# Patient Record
Sex: FEMALE | Race: WHITE | Employment: FULL TIME | URBAN - METROPOLITAN AREA
[De-identification: names, ages, dates, MRNs, and addresses within clinical notes are randomized per-mention and may not be internally consistent; named-entity substitution may affect disease eponyms.]

---

## 2017-02-15 ENCOUNTER — HOSPITAL ENCOUNTER (OUTPATIENT)
Dept: PHYSICAL THERAPY | Age: 27
Discharge: HOME | End: 2017-02-15
Payer: COMMERCIAL

## 2017-02-15 PROCEDURE — 97161 PT EVAL LOW COMPLEX 20 MIN: CPT

## 2017-02-15 NOTE — PROGRESS NOTES
PHYSICAL THERAPY - DAILY TREATMENT NOTE    Patient Name: Letty Morales        Date: 2/15/2017  : 1990   YES Patient  Verified  Visit #:     Insurance: Payor: Vern Gordon / Plan:  [address] [facility] / Product Type: Commerical /      In time: 8 Out time: 8:30   Total Treatment Time: 30     Medicare Time Tracking (below)   Total Timed Codes (min):  - 1:1 Treatment Time:  -     TREATMENT AREA =  L/S    SUBJECTIVE    Pain Level (on 0 to 10 scale):  4   10   Medication Changes/New allergies or changes in medical history, any new surgeries or procedures? NO    If yes, update Summary List   Subjective Functional Status/Changes:  []  No changes reported     See eval          OBJECTIVE    Modality rationale:  -    min [] Estim, type:                                          []  att     []  unatt     []  w/US     []  w/ice    []  w/heat    min []  Mechanical Traction: type/lbs                                               []  pro   []  sup   []  int   []  cont    min []  Ultrasound, settings/location:      min []  Iontophoresis:  []  take home patch w/ dexamethazone    min                                []  in clinic w/ dexamethazone    min []  Ice     []  Heat     position:     min []  Other:      - min Therapeutic Exercise:  [x]  See flow sheet   []  Other:      []  Added:     to improve (function):    []  Changed:     to improve (function):       min Therapeutic Activity:      5 min Manual Therapy: Passive L hip ext and R hip flex, MET to contract L hip flexor and R HS, GD IV AP mobs to sacrum, OP with REIL       min Gait Training:       min Patient Education:  YES  Reviewed HEP   []  Progressed/Changed HEP based on:         Other Objective/Functional Measures:    See eval     Post Treatment Pain Level (on 0 to 10) scale:  5  / 10     ASSESSMENT    []  See Progress Note/Recertification   Patient will continue to benefit from skilled therapy to address remaining functional deficits: see eval Progress toward goals / Updated goals:    -     PLAN    [x]  Upgrade activities as tolerated YES Continue plan of care   []  Discharge due to :    []  Other:      Therapist: David Jonas PT    Date: 2/15/2017 Time: 7:56 AM
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
## 2017-02-28 ENCOUNTER — HOSPITAL ENCOUNTER (OUTPATIENT)
Dept: PHYSICAL THERAPY | Age: 27
Discharge: HOME | End: 2017-02-28
Payer: COMMERCIAL

## 2017-02-28 PROCEDURE — 97140 MANUAL THERAPY 1/> REGIONS: CPT

## 2017-02-28 PROCEDURE — 97110 THERAPEUTIC EXERCISES: CPT

## 2017-02-28 NOTE — PROGRESS NOTES
PHYSICAL THERAPY - DAILY TREATMENT NOTE    Patient Name: Em Crooks        Date: 2017  : 1990    Patient  Verified: YES  Visit #:   2   of     Insurance: Payor: Kizzy Stinson / [facility]  [facility] / Product Type: Commerical /      In time: 5:40 Out time: 6:20   Total Treatment Time: 40     Medicare Time Tracking (below)   Total Timed Codes (min):  - 1:1 Treatment Time:  -     TREATMENT AREA/ DIAGNOSIS = SI (sacroiliac) joint dysfunction [M53.3]    SUBJECTIVE  Pain Level (on 0 to 10 scale):  0  / 10   Medication Changes/New allergies or changes in medical history, any new surgeries or procedures? NO    If yes, update Summary List   Subjective Functional Status/Changes:  []  No changes reported     Functional improvement no pain right now   Functional limitation worse in the morning        5 min Manual Therapy: OP with REIL, L LAD of hip   Rationale:      reduce disc to improve patient's ability to perform ADLs without pain    35 min Therapeutic Exercise:  [x]  See flow sheet   Rationale:      increase ROM and increase strength to improve the patients ability to perform ADLs without pain          min Patient Education:  Yes    [x] Reviewed HEP   []  Progressed/Changed HEP based on:         Other Objective/Functional Measures:    Pt reports the exercises (L SGIS and REIL) are helping  Pt with pain across mid sacrum with extension (REIL)   Did lots of core stability exercises without pain  Pt with elevated L illiac crest improved with L LAD   Post Treatment Pain Level (on 0 to 10) scale:   0  / 10     ASSESSMENT  Assessment/Changes in Function:     Less pain     []  See Progress Note/Recertification   Patient will continue to benefit from skilled PT services to modify and progress therapeutic interventions, address functional mobility deficits, address ROM deficits, address strength deficits, analyze and address soft tissue restrictions and analyze and cue movement patterns to attain remaining goals.    Progress toward goals / Updated goals:    Less pain     PLAN  [x]  Upgrade activities as tolerated YES Continue plan of care   []  Discharge due to :    []  Other:      Therapist: Pavan Gonzalez PT    Date: 2/28/2017 Time: 6:29 PM        Future Appointments  Date Time Provider Ruthann Linda   3/7/2017 8:00 AM Madison Cardenas, PT [facility]   3/9/2017 6:00 PM Pavan Gonzalez, PT [facility]   3/13/2017 6:00 PM Madison Cardenas PT [facility]   3/16/2017 6:00 PM Pavan Gonzalez PT [facility]   3/20/2017 6:00 PM Madison Cardenas PT [facility]   3/23/2017 6:00 PM Pavan Gonzalez PT [facility]   3/27/2017 6:00 PM Madison Cardenas PT [facility]   3/30/2017 6:00 PM Pavan Gonzalez, PT [facility]

## 2017-03-02 ENCOUNTER — HOSPITAL ENCOUNTER (OUTPATIENT)
Dept: PHYSICAL THERAPY | Age: 27
End: 2017-03-02
Payer: COMMERCIAL

## 2017-03-07 ENCOUNTER — HOSPITAL ENCOUNTER (OUTPATIENT)
Dept: PHYSICAL THERAPY | Age: 27
Discharge: HOME | End: 2017-03-07
Payer: COMMERCIAL

## 2017-03-07 PROCEDURE — 97110 THERAPEUTIC EXERCISES: CPT

## 2017-03-07 PROCEDURE — 97140 MANUAL THERAPY 1/> REGIONS: CPT

## 2017-03-07 NOTE — PROGRESS NOTES
PHYSICAL THERAPY - DAILY TREATMENT NOTE      Patient Name: Monica Nicole        Date: 3/7/2017  : 1990   YES Patient  Verified  Visit #:   3   of   12  Insurance: Payor: Unknown Ades / Plan: [address] PT / Product Type: Commerical /      In time: 7:45 Out time: 8:25   Total Treatment Time: 40     Medicare Time Tracking (below)   Total Timed Codes (min):   1:1 Treatment Time:       TREATMENT AREA = SI (sacroiliac) joint dysfunction [M53.3]    SUBJECTIVE    Pain Level (on 0 to 10 scale):  0  / 10   Medication Changes/New allergies or changes in medical history, any new surgeries or procedures? NO    If yes, update Summary List   Subjective Functional Status/Changes:  []  No changes reported     \"Less burning in the L glute but still feels a little pinchy on the R side. I've been doing a lot better overall. \"       OBJECTIVE    30 min Therapeutic Exercise:  [x]  See flow sheet   Rationale:      increase ROM and increase strength to improve the patients ability to complete ADLs       10 min Manual Therapy:  P/A mobs to L sacral base, DTM L piriformis   Rationale:      decrease pain, increase ROM and increase tissue extensibility to improve patient's ability to complete ADLs       min Patient Education:  YES  Reviewed HEP   []  Progressed/Changed HEP based on: Other Objective/Functional Measures:    Pt able to do figure 4 piriformis stretch B without pain. Sacrum appeared slightly rotated to the R; addressed with manual     Post Treatment Pain Level (on 0 to 10) scale:   0  / 10     ASSESSMENT    Assessment/Changes in Function:     Decreased pain overall. She reports intermittent ache in R side of hip and some muscle burning in L glute but this is decreasing.      []  See Progress Note/Recertification   Patient will continue to benefit from skilled PT services to modify and progress therapeutic interventions, address functional mobility deficits, address ROM deficits, address strength deficits, analyze and address soft tissue restrictions, analyze and cue movement patterns, analyze and modify body mechanics/ergonomics, assess and modify postural abnormalities and instruct in home and community integration to attain remaining goals. to attain remaining goals.    Progress toward goals / Updated goals:    Progressing towards all goals     PLAN    []  Upgrade activities as tolerated YES Continue plan of care   []  Discharge due to :    []  Other:      Therapist: Leopoldo Sero, PT    Date: 3/7/2017 Time: 8:11 AM   Future Appointments  Date Time Provider Ruthann Linda   3/9/2017 6:00 PM Drusilla Oppenheim, PT [facility]   3/13/2017 6:00 PM Leopoldo Sero, PT [facility]   3/16/2017 6:00 PM Drusilla Oppenheim, PT [facility]   3/20/2017 6:00 PM Leopoldo Sero, PT [facility]   3/23/2017 6:00 PM Drusilla Oppenheim, PT [facility]   3/27/2017 6:00 PM Leopoldo Sero, PT [facility]   3/30/2017 6:00 PM Drusilla Oppenheim, PT [facility]

## 2017-03-09 ENCOUNTER — HOSPITAL ENCOUNTER (OUTPATIENT)
Dept: PHYSICAL THERAPY | Age: 27
Discharge: HOME | End: 2017-03-09
Payer: COMMERCIAL

## 2017-03-09 PROCEDURE — 97140 MANUAL THERAPY 1/> REGIONS: CPT

## 2017-03-09 PROCEDURE — 97110 THERAPEUTIC EXERCISES: CPT

## 2017-03-10 NOTE — PROGRESS NOTES
PHYSICAL THERAPY - DAILY TREATMENT NOTE    Patient Name: Curlie Skiff        Date: 3/9/2017  : 1990    Patient  Verified: YES  Visit #:   4   of   12  Insurance: Payor: Marianela Mota / Plan:  [address] [facility] / Product Type: Commerical /      In time: 5:50 Out time: 6:40   Total Treatment Time: 50     Medicare Time Tracking (below)   Total Timed Codes (min):  - 1:1 Treatment Time:  -     TREATMENT AREA/ DIAGNOSIS = SI (sacroiliac) joint dysfunction [M53.3]    SUBJECTIVE  Pain Level (on 0 to 10 scale):  2  / 10   Medication Changes/New allergies or changes in medical history, any new surgeries or procedures?     NO    If yes, update Summary List   Subjective Functional Status/Changes:  []  No changes reported     Functional improvement  i'm better  Functional limitation  Bad d ay today     OBJECTIVE  Modalities Rationale:     decrease edema, decrease inflammation and decrease pain to improve patient's ability to perform ADLs without pain   min [] Estim, type/location:                                      []  att     []  unatt     []  w/US     []  w/ice    []  w/heat    min []  Mechanical Traction: type/lbs                   []  pro   []  sup   []  int   []  cont    []  before manual    []  after manual    min []  Ultrasound, settings/location:      min []  Iontophoresis w/ dexamethasone, location:                                               []  take home patch       []  in clinic   10 min [x]  Ice     []  Heat    location/position:     min []  Vasopneumatic Device, press/temp:     min []  Other:    [x] Skin assessment post-treatment (if applicable):    [x]  intact    []  redness- no adverse reaction     []redness  adverse reaction:        20 min Manual Therapy: DTM to L/S and upper glutes, GD VI PA mobs to sacrum, OP wit REIL, MET to correct L post inominate   Rationale:      decrease pain, increase ROM and increase tissue extensibility to improve patient's ability to perform ADLs without pain    20 min Therapeutic Exercise:  [x]  See flow sheet   Rationale:      increase ROM and increase strength to improve the patients ability to perform ADLs without pain          min Patient Education:  Yes    [x] Reviewed HEP   []  Progressed/Changed HEP based on: Other Objective/Functional Measures:    Pt with B buttock pain and L thigh tingling (earlier today)  Pain with sit to stand  Pain free sit to stand after REIL and REIL with OP and use of proper hip hinge to stand   pt with elevated L ASIS, unable to correct with manual therapy   Post Treatment Pain Level (on 0 to 10) scale:   1  / 10     ASSESSMENT  Assessment/Changes in Function:     Pt with signs of L lower L/S disc derangement and SI joint dysfunction     []  See Progress Note/Recertification   Patient will continue to benefit from skilled PT services to modify and progress therapeutic interventions, address functional mobility deficits, address ROM deficits, address strength deficits, analyze and address soft tissue restrictions and analyze and cue movement patterns to attain remaining goals.    Progress toward goals / Updated goals:    Pt with less pain after treatment     PLAN  [x]  Upgrade activities as tolerated YES Continue plan of care   []  Discharge due to :    []  Other:      Therapist: Warden Kari PT    Date: 3/9/2017 Time: 7:00 PM        Future Appointments  Date Time Provider Ruthann Linda   3/13/2017 6:00 PM Madalyn Rogers [facility]   3/16/2017 6:00 PM Warden Kari PT [facility]   3/20/2017 6:00 PM Lainey Russell PT [facility]   3/23/2017 6:00 PM Warden Kari PT [facility]   3/27/2017 6:00 PM Lainey Russell PT [facility]   3/30/2017 6:00 PM Warden Kari PT [facility]

## 2017-03-13 ENCOUNTER — HOSPITAL ENCOUNTER (OUTPATIENT)
Dept: PHYSICAL THERAPY | Age: 27
Discharge: HOME | End: 2017-03-13
Payer: COMMERCIAL

## 2017-03-13 PROCEDURE — 97140 MANUAL THERAPY 1/> REGIONS: CPT

## 2017-03-13 PROCEDURE — 97110 THERAPEUTIC EXERCISES: CPT

## 2017-03-13 NOTE — PROGRESS NOTES
Mary Sheehan 30  [facility] PHYSICAL THERAPY AT [address] [facility], [address] [place] [address], [address] [place] [address], [address] [place] [address]  Phone: [phone]  Fax: [phone]  PROGRESS NOTE  Patient Name: Jagdeep Jules : 1990   Treatment/Medical Diagnosis: SI (sacroiliac) joint dysfunction [M53.3]   Referral Source: July Gasca DO     Date of Initial Visit: 2/15/17 Attended Visits: 5 Missed Visits: 0     SUMMARY OF TREATMENT  Physical therapy has consisted of therapeutic exercise and neuromuscular re-education for lumbar extension exercises, core strengthening, hip ROM, manual therapy including joint mobs and DTM, pt education for activity modification and HEP, and ice for pain relief. CURRENT STATUS  Pt has made slow, steady progress with above POC. She reports decreased low back pain and L LE sx have almost completely resolved. She has responded well with extension exercises and is able to centralize sx. However, extension seemed to aggravate SIJ pain at most recent visit. Pelvic alignment remains asymmetrical, and maintaining correction with MET has been limited. FOTO score improved to 67% compared to 54% at initial evaluation. Pt would benefit from continued PT to restore normal alignment, improve core strength, and decrease sx to facilitate work duties and working out. Previous Goals:  1. Patient able to centralize her L buttock pain with HEP  2. Achieve and maintain proper pelvic alingement for > 2 weeks     Prior Level/Current Level:  1) Prior Level: unable   Current Level: able to centralize with extension   Goal Met? yes  2) Prior Level: n/a   Current Level: unable to maintain proper pelvic alignment   Goal Met? no    New Goals to be achieved in __4__  weeks:  1. Achieve and maintain proper pelvic alingement for > 2 weeks  2. All ADLs without LBP > 2/10  3. Able to return to previoius level of activity, including running and softball, without LBP > 2/10  4.  Patient Independent with HEP/selfcare    RECOMMENDATIONS  Continue PT 2x/week for 4 weeks  If you have any questions/comments please contact us directly at [phone]. Thank you for allowing us to assist in the care of your patient. Therapist Signature: Nury Goodman PT, DPT Date: 3/13/2017     Time: 7:02 PM   NOTE TO PHYSICIAN:  PLEASE COMPLETE THE ORDERS BELOW AND FAX TO   [facility] at [address]: [phone]. If you are unable to process this request in 24 hours please contact our office: [phone].    ___ I have read the above report and request that my patient continue as recommended.   ___ I have read the above report and request that my patient continue therapy with the following changes/special instructions:_________________________________________________________   ___ I have read the above report and request that my patient be discharged from therapy.      Physician Signature:        Date:       Time:

## 2017-03-13 NOTE — PROGRESS NOTES
PHYSICAL THERAPY - DAILY TREATMENT NOTE      Patient Name: Alana Victoria        Date: 3/13/2017  : 1990   YES Patient  Verified  Visit #:     Insurance: Payor: Tyler Parikh / Plan: [address] PT / Product Type: Commerical /      In time: 5:55 Out time: 6:50   Total Treatment Time: 55     Medicare Time Tracking (below)   Total Timed Codes (min):   1:1 Treatment Time:       TREATMENT AREA = SI (sacroiliac) joint dysfunction [M53.3]    SUBJECTIVE    Pain Level (on 0 to 10 scale):  1  / 10   Medication Changes/New allergies or changes in medical history, any new surgeries or procedures? NO    If yes, update Summary List   Subjective Functional Status/Changes:  []  No changes reported     \"I still can't sit up and make a 90 degree angle with my body. I still have some pain in the SIJ when I make quick movements. \"       OBJECTIVE    30 min Therapeutic Exercise:  [x]  See flow sheet   Rationale:      increase ROM and increase strength to improve the patients ability to sit, run       15 min Manual Therapy: A/P mobs to sacral base, MET to correct post rot of L inominate, DTM B sacral borders and piriformis muscles   Rationale:      decrease pain, increase ROM, increase tissue extensibility and decrease trigger points to improve patient's ability to sit, run    Modalities Rationale:     decrease pain to improve patient's ability to sit      min [] Estim, type/location:                                      []  att     []  unatt     []  w/US     []  w/ice    []  w/heat    min []  Mechanical Traction: type/lbs                   []  pro   []  sup   []  int   []  cont    []  before manual    []  after manual    min []  Ultrasound, settings/location:      min []  Iontophoresis w/ dexamethasone, location:                                               []  take home patch       []  in clinic   10 min [x]  Ice     []  Heat    location/position: Low back; supine    min []  Vasopneumatic Device, press/temp:     min []  Other:    [] Skin assessment post-treatment (if applicable):    [x]  intact    [x]  redness- no adverse reaction     []redness  adverse reaction:       min Patient Education:  YES  Reviewed HEP   []  Progressed/Changed HEP based on: Other Objective/Functional Measures:    Pt reports intermittent pain in R posterior/superior hip area lacie with long sitting. Intermittent sx in L leg but these have subsided significantly. Pt has \"pinching\" and some \"electric pain\" with press ups after doing prone LE extension. Unable to change with overpressure during press ups or with sacral mobs. Corrected technique for PHE to avoid lumbar ext but pt advised to hold press ups and focus on side glides if back or leg pain returns. Pt c/o aching in L shoulder as well. Post Treatment Pain Level (on 0 to 10) scale:   0  / 10     ASSESSMENT    Assessment/Changes in Function:     See PN     []  See Progress Note/Recertification   Patient will continue to benefit from skilled PT services to modify and progress therapeutic interventions, address functional mobility deficits, address ROM deficits, address strength deficits, analyze and address soft tissue restrictions, analyze and cue movement patterns, analyze and modify body mechanics/ergonomics, assess and modify postural abnormalities and instruct in home and community integration to attain remaining goals. to attain remaining goals.    Progress toward goals / Updated goals:    See PN     PLAN    []  Upgrade activities as tolerated YES Continue plan of care   []  Discharge due to :    []  Other:      Therapist: Steph Jara PT    Date: 3/13/2017 Time: 5:56 PM   Future Appointments  Date Time Provider Ruthann Linda   3/13/2017 6:00 PM Madalyn Williamson [facility]   3/16/2017 6:00 PM Love Corado, PT [facility]   3/20/2017 6:00 PM Steph Jara PT [facility]   3/23/2017 6:00 PM Love Corado, PT [facility]   3/27/2017 9:30 DIEGO Nguyen, PT [facility]   3/30/2017 6:00 PM Drusilla Oppenheim, PT [facility]

## 2017-03-16 ENCOUNTER — HOSPITAL ENCOUNTER (OUTPATIENT)
Dept: PHYSICAL THERAPY | Age: 27
Discharge: HOME | End: 2017-03-16
Payer: COMMERCIAL

## 2017-03-16 PROCEDURE — 97140 MANUAL THERAPY 1/> REGIONS: CPT

## 2017-03-16 PROCEDURE — 97110 THERAPEUTIC EXERCISES: CPT

## 2017-03-16 NOTE — PROGRESS NOTES
PHYSICAL THERAPY - DAILY TREATMENT NOTE    Patient Name: Kwesi Campos        Date: 3/16/2017  : 1990    Patient  Verified: YES  Visit #:     Insurance: Payor: Laly Willett / Plan:  [facility] [address] PT / Product Type: Commerical /      In time: 5:45 Out time: 6:25   Total Treatment Time: 40     Medicare Time Tracking (below)   Total Timed Codes (min):  - 1:1 Treatment Time:  -     TREATMENT AREA/ DIAGNOSIS = SI (sacroiliac) joint dysfunction [M53.3]    SUBJECTIVE  Pain Level (on 0 to 10 scale):  1  / 10   Medication Changes/New allergies or changes in medical history, any new surgeries or procedures?     NO    If yes, update Summary List   Subjective Functional Status/Changes:  []  No changes reported     Functional improvement im much better   Functional limitation it hurts to do the leg lifts on my stomach      OBJECTIVE  Modalities Rationale:     decrease edema, decrease inflammation and decrease pain to improve patient's ability to perform ADLs without pain   min [] Estim, type/location:                                      []  att     []  unatt     []  w/US     []  w/ice    []  w/heat    min []  Mechanical Traction: type/lbs                   []  pro   []  sup   []  int   []  cont    []  before manual    []  after manual    min []  Ultrasound, settings/location:      min []  Iontophoresis w/ dexamethasone, location:                                               []  take home patch       []  in clinic   10 min [x]  Ice     []  Heat    location/position:     min []  Vasopneumatic Device, press/temp:     min []  Other:    [x] Skin assessment post-treatment (if applicable):    [x]  intact    []  redness- no adverse reaction     []redness  adverse reaction:        10 min Manual Therapy: DTM to L/S and lower T/S, OP with REIL, L LE LAD   Rationale:      decrease pain, increase ROM and increase tissue extensibility to improve patient's ability to perform ADLs without pain    20 min Therapeutic Exercise:  [x]  See flow sheet   Rationale:      increase ROM and increase strength to improve the patients ability to perform ADLs without pain          min Patient Education:  Yes    [x] Reviewed HEP   []  Progressed/Changed HEP based on: Other Objective/Functional Measures:    Slight pinch in sacrum with PHE and REIL  Much less pain in general   Post Treatment Pain Level (on 0 to 10) scale:   0  / 10     ASSESSMENT  Assessment/Changes in Function:     Responding well to extension  Still some sacro-lumbar issues     []  See Progress Note/Recertification   Patient will continue to benefit from skilled PT services to modify and progress therapeutic interventions, address functional mobility deficits, address ROM deficits, address strength deficits, analyze and address soft tissue restrictions, analyze and cue movement patterns and analyze and modify body mechanics/ergonomics to attain remaining goals.    Progress toward goals / Updated goals:    Pt now working out at the gym, including elliptical, without pain     PLAN  [x]  Upgrade activities as tolerated YES Continue plan of care   []  Discharge due to :    []  Other:      Therapist: Joey Gallardo PT    Date: 3/16/2017 Time: 6:33 PM        Future Appointments  Date Time Provider Ruthann Linda   3/20/2017 6:00 PM Madalyn Baxter [facility]   3/23/2017 6:00 PM Joey Gallardo PT [facility] AT [facility]   3/27/2017 9:30 AM Pato Alfonso PT [facility] AT [facility]   3/30/2017 6:00 PM Joey Gallardo PT [facility] AT [facility]

## 2017-03-20 ENCOUNTER — HOSPITAL ENCOUNTER (OUTPATIENT)
Dept: PHYSICAL THERAPY | Age: 27
Discharge: HOME | End: 2017-03-20
Payer: COMMERCIAL

## 2017-03-20 PROCEDURE — 97140 MANUAL THERAPY 1/> REGIONS: CPT

## 2017-03-20 PROCEDURE — 97110 THERAPEUTIC EXERCISES: CPT

## 2017-03-20 NOTE — PROGRESS NOTES
PHYSICAL THERAPY - DAILY TREATMENT NOTE      Patient Name: Alana Victoria        Date: 3/20/2017  : 1990   YES Patient  Verified  Visit #:     Insurance: Payor: Tyler Parikh / Plan: [address] [place] [address] Rd PT / Product Type: Commerical /      In time: 6:00 Out time: 6:45   Total Treatment Time: 45     Medicare Time Tracking (below)   Total Timed Codes (min):   1:1 Treatment Time:       TREATMENT AREA = SI (sacroiliac) joint dysfunction [M53.3]    SUBJECTIVE    Pain Level (on 0 to 10 scale):  0  / 10   Medication Changes/New allergies or changes in medical history, any new surgeries or procedures? NO    If yes, update Summary List   Subjective Functional Status/Changes:  []  No changes reported     \"The back pain just comes and goes. I still can't sit with my legs out straight. \"       OBJECTIVE    20 min Therapeutic Exercise:  [x]  See flow sheet   Rationale:      increase ROM and increase strength to improve the patients ability to complete ADLs, work out       15 min Manual Therapy:  DTM R QL, iliac crest release, P/A mobs to sacral base, OP for REIL   Rationale:      decrease pain, increase ROM, increase tissue extensibility and decrease trigger points to improve patient's ability to complete ADLs, work out    Modalities Rationale:     decrease pain to improve patient's ability to complete ADLs      min [] Estim, type/location:                                      []  att     []  unatt     []  w/US     []  w/ice    []  w/heat    min []  Mechanical Traction: type/lbs                   []  pro   []  sup   []  int   []  cont    []  before manual    []  after manual    min []  Ultrasound, settings/location:      min []  Iontophoresis w/ dexamethasone, location:                                               []  take home patch       []  in clinic   10 min [x]  Ice     []  Heat    location/position: Low back; supine    min []  Vasopneumatic Device, press/temp:     min []  Other:    [] Skin assessment post-treatment (if applicable):    [x]  intact    [x]  redness- no adverse reaction     []redness  adverse reaction:       min Patient Education:  YES  Reviewed HEP   []  Progressed/Changed HEP based on: Other Objective/Functional Measures:    Pt reported increased pulling/pain in R buttock with nerve glides on the L (ankle DF). When cued to straighten L knee more, pain decreased. When R leg was bent at hip and knee, pain increased. Post Treatment Pain Level (on 0 to 10) scale:   0  / 10     ASSESSMENT    Assessment/Changes in Function:     Decreased pain following manual and mat exercises. No pain with cat/cow today, which she reports usually brings on pain. Pt issued GTB for clams, R QL stretch, and segmental bridging for HEP     []  See Progress Note/Recertification   Patient will continue to benefit from skilled PT services to modify and progress therapeutic interventions, address functional mobility deficits, address ROM deficits, address strength deficits, analyze and address soft tissue restrictions, analyze and cue movement patterns, analyze and modify body mechanics/ergonomics, assess and modify postural abnormalities and instruct in home and community integration to attain remaining goals. to attain remaining goals.    Progress toward goals / Updated goals:    Slow progress towards all goals     PLAN    []  Upgrade activities as tolerated YES Continue plan of care   []  Discharge due to :    []  Other:      Therapist: Kristine Benítez PT    Date: 3/20/2017 Time: 6:01 PM   Future Appointments  Date Time Provider Ruthann Linda   3/23/2017 6:00 PM Amador Gamble PT [facility] AT [facility]   3/27/2017 9:30 AM Cj Meredith PT [facility] AT [facility]   3/30/2017 6:00 PM Amador Gamble PT [facility] AT [facility]

## 2017-03-23 ENCOUNTER — HOSPITAL ENCOUNTER (OUTPATIENT)
Dept: PHYSICAL THERAPY | Age: 27
Discharge: HOME | End: 2017-03-23
Payer: COMMERCIAL

## 2017-03-23 PROCEDURE — 97110 THERAPEUTIC EXERCISES: CPT

## 2017-03-23 PROCEDURE — 97140 MANUAL THERAPY 1/> REGIONS: CPT

## 2017-03-23 NOTE — PROGRESS NOTES
PHYSICAL THERAPY - DAILY TREATMENT NOTE    Patient Name: Varsha Sanchez        Date: 3/23/2017  : 1990    Patient  Verified: YES  Visit #:   8   of   16  Insurance: Payor: Louise North / Plan: [address] [place] [address] Rd PT / Product Type: Commerical /      In time: 5:40 Out time: 6:30   Total Treatment Time: 50     Medicare Time Tracking (below)   Total Timed Codes (min):  - 1:1 Treatment Time:  -     TREATMENT AREA/ DIAGNOSIS = SI (sacroiliac) joint dysfunction [M53.3]    SUBJECTIVE  Pain Level (on 0 to 10 scale):  0  / 10   Medication Changes/New allergies or changes in medical history, any new surgeries or procedures?     NO    If yes, update Summary List   Subjective Functional Status/Changes:  []  No changes reported     Functional improvement i feel good, no pain the last few days   Functional limitation-      OBJECTIVE  Modalities Rationale:     decrease edema, decrease inflammation and decrease pain to improve patient's ability to perform ADLs without pain   min [] Estim, type/location:                                      []  att     []  unatt     []  w/US     []  w/ice    []  w/heat    min []  Mechanical Traction: type/lbs                   []  pro   []  sup   []  int   []  cont    []  before manual    []  after manual    min []  Ultrasound, settings/location:      min []  Iontophoresis w/ dexamethasone, location:                                               []  take home patch       []  in clinic   10 min [x]  Ice     []  Heat    location/position:     min []  Vasopneumatic Device, press/temp:     min []  Other:    [x] Skin assessment post-treatment (if applicable):    [x]  intact    []  redness- no adverse reaction     []redness  adverse reaction:        10 min Manual Therapy: DTM to L/S and T/S, OP with rEIL   Rationale:      decrease pain, increase ROM and increase tissue extensibility to improve patient's ability to perform ADLs without pain    30 min Therapeutic Exercise:  [x]  See flow sheet Rationale:      increase ROM and increase strength to improve the patients ability to perform ADLs without pain          min Patient Education:  Yes    [x] Reviewed HEP   []  Progressed/Changed HEP based on: Other Objective/Functional Measures:    Pt with no pain the last few days  Tolerating flexion without pain in CAT stretch   Post Treatment Pain Level (on 0 to 10) scale:   0  / 10     ASSESSMENT  Assessment/Changes in Function:     Pt with less pain lately     []  See Progress Note/Recertification   Patient will continue to benefit from skilled PT services to modify and progress therapeutic interventions, address functional mobility deficits, address ROM deficits, address strength deficits, analyze and address soft tissue restrictions, analyze and cue movement patterns and analyze and modify body mechanics/ergonomics to attain remaining goals.    Progress toward goals / Updated goals:    No pain     PLAN  [x]  Upgrade activities as tolerated YES Continue plan of care   []  Discharge due to :    []  Other:      Therapist: Marilyn Cullen PT    Date: 3/23/2017 Time: 6:27 PM        Future Appointments  Date Time Provider Ruthann Linda   3/27/2017 9:30 AM Kristina Dunaway PT RE[facility] AT [facility]   3/30/2017 6:00 PM Marilyn Cullen PT RE[facility] AT [facility]

## 2017-03-27 ENCOUNTER — HOSPITAL ENCOUNTER (OUTPATIENT)
Dept: PHYSICAL THERAPY | Age: 27
Discharge: HOME | End: 2017-03-27
Payer: COMMERCIAL

## 2017-03-27 PROCEDURE — 97140 MANUAL THERAPY 1/> REGIONS: CPT | Performed by: PHYSICAL THERAPIST

## 2017-03-27 PROCEDURE — 97110 THERAPEUTIC EXERCISES: CPT | Performed by: PHYSICAL THERAPIST

## 2017-03-27 NOTE — PROGRESS NOTES
PHYSICAL THERAPY - DAILY TREATMENT NOTE    Patient Name: Eva Rueda        Date: 3/27/2017  : 1990   YES Patient  Verified  Visit #:     Insurance: Payor: Blaine Arreola / Plan: [address] / Product Type: Commerical /      In time: 043 Out time: 1030   Total Treatment Time: 55     TREATMENT AREA =  SI (sacroiliac) joint dysfunction [M53.3]    SUBJECTIVE  Pain Level (on 0 to 10 scale):  3  / 10   Medication Changes/New allergies or changes in medical history, any new surgeries or procedures? 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 My L SI hurts, I can't bend, and my R hip is achy. OBJECTIVE  Modalities Rationale:     decrease edema, decrease inflammation and decrease pain to improve patient's ability to  improve patient's ability to perform ADL's with decreased pain     min [] Estim, type/location:                                      []  att     []  unatt     []  w/US     []  w/ice    []  w/heat    min []  Mechanical Traction: type/lbs                   []  pro   []  sup   []  int   []  cont    []  before manual    []  after manual    min []  Ultrasound, settings/location:      min []  Iontophoresis w/ dexamethasone, location:                                               []  take home patch       []  in clinic   5 min [x]  Ice     []  Heat    location/position: Supine/hooklying    min []  Vasopneumatic Device, press/temp:     min []  Other:    [x] Skin assessment post-treatment (if applicable):    [x]  intact    []  redness- no adverse reaction     []redness  adverse reaction:        20 min Manual Therapy: SI mobilization with MET: R hip innom ant rotation, L on R sacral torsion. R hip grade 3 lateral and inferior hip glides. B QL DTM and R glute DTM.    Rationale:      decrease pain, increase ROM, increase tissue extensibility and decrease trigger points to improve patient's ability to stand/walk with manageable pain. 30 min Therapeutic Exercise:  [x]  See flow sheet   Rationale:      increase ROM and increase strength to improve the patients ability to  improve patient's ability to perform ADL's with decreased pain        min Patient Education:  YES  Reviewed HEP   []  Progressed/Changed HEP based on: Other Objective/Functional Measures:    R hip innom + in flexion, + squish test. R hip innom down, R leg long, pube level. R sacral sulcus deep and L BOB post/inf in prone. Sacral sulcus leveled with extension. All level post MET. Added TrAb pull in's, BKFO, femur arcs due to increased pain today. Post Treatment Pain Level (on 0 to 10) scale:   1-2  / 10     ASSESSMENT  Assessment/Changes in Function:     The pt likely flared back with attempt at return to softball activities and SI joint not accepting of these activities due to weakness and ongoing instability. []  See Progress Note/Recertification   Patient will continue to benefit from skilled PT services to modify and progress therapeutic interventions, address functional mobility deficits, address ROM deficits, address strength deficits, analyze and address soft tissue restrictions, analyze and cue movement patterns, analyze and modify body mechanics/ergonomics and assess and modify postural abnormalities to attain remaining goals. Progress toward goals / Updated goals:    New Goals to be achieved in __4__ weeks:  1. Achieve and maintain proper pelvic alingement for > 2 weeks  2. All ADLs without LBP > 2/10  3. Able to return to previoius level of activity, including running and softball, without LBP > 2/10  (attempted this, but unable to keep pain down)  4.  Patient Independent with HEP/selfcare  (progressing)     PLAN  [x]  Upgrade activities as tolerated YES Continue plan of care   []  Discharge due to :    []  Other: Therapist: Cj Meredith PT    Date: 3/27/2017 Time: 10:05 AM     Future Appointments  Date Time Provider Ruthann Linda   3/30/2017 6:00 PM Amador Gamble PT REH[facility] AT [facility]

## 2017-03-30 ENCOUNTER — HOSPITAL ENCOUNTER (OUTPATIENT)
Dept: PHYSICAL THERAPY | Age: 27
Discharge: HOME | End: 2017-03-30
Payer: COMMERCIAL

## 2017-03-30 PROCEDURE — 97110 THERAPEUTIC EXERCISES: CPT

## 2017-03-30 PROCEDURE — 97140 MANUAL THERAPY 1/> REGIONS: CPT

## 2017-03-30 NOTE — PROGRESS NOTES
PHYSICAL THERAPY - DAILY TREATMENT NOTE    Patient Name: Ana Chan        Date: 3/30/2017  : 1990    Patient  Verified: YES  Visit #:   10   of   16  Insurance: Payor: Walter Parikh / Plan:  [address] PT / Product Type: Commerical /      In time: 5:05 Out time: 5:45   Total Treatment Time: 40     Medicare Time Tracking (below)   Total Timed Codes (min):  - 1:1 Treatment Time:  -     TREATMENT AREA/ DIAGNOSIS = SI (sacroiliac) joint dysfunction [M53.3]    SUBJECTIVE  Pain Level (on 0 to 10 scale):  4  / 10   Medication Changes/New allergies or changes in medical history, any new surgeries or procedures?     NO    If yes, update Summary List   Subjective Functional Status/Changes:  []  No changes reported     Functional improvement  Softball made my pain much worse      OBJECTIVE  Modalities Rationale:     decrease edema, decrease inflammation and decrease pain to improve patient's ability to perform ADLs without pain   min [] Estim, type/location:                                      []  att     []  unatt     []  w/US     []  w/ice    []  w/heat    min []  Mechanical Traction: type/lbs                   []  pro   []  sup   []  int   []  cont    []  before manual    []  after manual    min []  Ultrasound, settings/location:      min []  Iontophoresis w/ dexamethasone, location:                                               []  take home patch       []  in clinic   10 min [x]  Ice     []  Heat    location/position:     min []  Vasopneumatic Device, press/temp:     min []  Other:    [x] Skin assessment post-treatment (if applicable):    [x]  intact    []  redness- no adverse reaction     []redness  adverse reaction:        10 min Manual Therapy: PA mobs to R sacral border, passive L hip extension, MET to contract L hip flexor and R HS    Rationale:      decrease pain, increase ROM and increase tissue extensibility to improve patient's ability to perform ADLs without pain    20 min Therapeutic Exercise:  [x]  See flow sheet   Rationale:      increase ROM, increase strength and improve coordination to improve the patients ability to perform ADLs without pain2          min Patient Education:  Yes    [x] Reviewed HEP   []  Progressed/Changed HEP based on: Other Objective/Functional Measures:    Pt with much more pain after attmpted softball this weekend  Pt with elevated L ASIS and  R rotated sacrum  Pain in L SI with REIL, made much less with manual to sacrum  Pt aadvised to avoid high impact exercise and focus on neutral core strengthening   Post Treatment Pain Level (on 0 to 10) scale:   1  / 10     ASSESSMENT  Assessment/Changes in Function:     SI knocked out of whack with softball     []  See Progress Note/Recertification   Patient will continue to benefit from skilled PT services to modify and progress therapeutic interventions, address functional mobility deficits, address ROM deficits, address strength deficits, analyze and address soft tissue restrictions, analyze and cue movement patterns and analyze and modify body mechanics/ergonomics to attain remaining goals.    Progress toward goals / Updated goals:    Less pain after treatment     PLAN  [x]  Upgrade activities as tolerated YES Continue plan of care   []  Discharge due to :    []  Other:      Therapist: Carmine Eddy PT    Date: 3/30/2017 Time: 6:33 PM        Future Appointments  Date Time Provider Ruthann Linda   4/4/2017 6:00 PM Carmine Eddy PT REHAB CENTER AT [address]   4/6/2017 7:30 AM Carmine Eddy, PT REHAB CENTER AT [address]   4/12/2017 6:00 PM Nawaf Nelson PT REHAB CENTER AT [address]   4/17/2017 6:00 PM Nawaf Nelson PT REHAB CENTER AT [address]   4/19/2017 4:30 PM Daniel Zarate PT REHAB CENTER AT [address]   4/24/2017 6:00 PM Nawaf Nelson PT REHAB CENTER AT [address]   4/26/2017 4:30 PM Daniel Zarate, PT REHAB CENTER AT [address]

## 2017-04-04 ENCOUNTER — HOSPITAL ENCOUNTER (OUTPATIENT)
Dept: PHYSICAL THERAPY | Age: 27
Discharge: HOME | End: 2017-04-04
Payer: COMMERCIAL

## 2017-04-04 PROCEDURE — 97140 MANUAL THERAPY 1/> REGIONS: CPT

## 2017-04-04 PROCEDURE — 97110 THERAPEUTIC EXERCISES: CPT

## 2017-04-04 NOTE — PROGRESS NOTES
PHYSICAL THERAPY - DAILY TREATMENT NOTE    Patient Name: Miguel A Gonzales        Date: 2017  : 1990    Patient  Verified: YES  Visit #:   6   of   16  Insurance: Payor: Shahnaz Loya / Plan:  [facility] [address] / Product Type: Commerical /      In time: 5:30 Out time: 6:10   Total Treatment Time: 40     Medicare Time Tracking (below)   Total Timed Codes (min):  - 1:1 Treatment Time:  -     TREATMENT AREA/ DIAGNOSIS = SI (sacroiliac) joint dysfunction [M53.3]    SUBJECTIVE  Pain Level (on 0 to 10 scale):  1  / 10   Medication Changes/New allergies or changes in medical history, any new surgeries or procedures?     NO    If yes, update Summary List   Subjective Functional Status/Changes:  []  No changes reported     Functional improvement the pain is not bad    Functional limitation mild pain with ext      OBJECTIVE  Modalities Rationale:     decrease edema, decrease inflammation and decrease pain to improve patient's ability to perform ADLs without pain   min [] Estim, type/location:                                      []  att     []  unatt     []  w/US     []  w/ice    []  w/heat    min []  Mechanical Traction: type/lbs                   []  pro   []  sup   []  int   []  cont    []  before manual    []  after manual    min []  Ultrasound, settings/location:      min []  Iontophoresis w/ dexamethasone, location:                                               []  take home patch       []  in clinic   10 min [x]  Ice     []  Heat    location/position:     min []  Vasopneumatic Device, press/temp:     min []  Other:    [x] Skin assessment post-treatment (if applicable):    [x]  intact    [x]  redness- no adverse reaction     []redness  adverse reaction:        20   min Manual Therapy: DTM to L/S, R piriformis release, L inf to sup sacral mobs, R sacral border PA mobs, passive R hip flexion,  MET to contract L hip flexor and R HS   Rationale:      decrease pain, increase ROM and increase tissue extensibility to improve patient's ability to perform ADLs without pain    10 min Therapeutic Exercise:  [x]  See flow sheet   Rationale:      increase strength to improve the patients ability to perform ADLs without pain          min Patient Education:  Yes    [x] Reviewed HEP   []  Progressed/Changed HEP based on: Other Objective/Functional Measures:    Pt with mild R SI joint pain with extension (L/S)  Made pain much worse in R lateral sacrum with spasm pain in R HS and piriformis after manual, unable to correct,   L illium high and L ASIS high relative to R  L lat flexion of sacrum  Pain worse after therapy   Post Treatment Pain Level (on 0 to 10) scale:   5  / 10     ASSESSMENT  Assessment/Changes in Function:     Transfer to [facility]? []  See Progress Note/Recertification   Patient will continue to benefit from skilled PT services to modify and progress therapeutic interventions, address functional mobility deficits, address ROM deficits, address strength deficits, analyze and address soft tissue restrictions, analyze and cue movement patterns and analyze and modify body mechanics/ergonomics to attain remaining goals. Progress toward goals / Updated goals:     May transfer patient to clinic with late [address] [place]  [x]  Upgrade activities as tolerated YES Continue plan of care   []  Discharge due to :    []  Other:      Therapist: Deniz Fajardo PT    Date: 4/4/2017 Time: 6:56 PM        Future Appointments  Date Time Provider Ruthann Linda   4/6/2017 7:30 AM Deniz Fajardo PT [facility] AT [facility]   4/12/2017 6:00 PM Timo Romero PT RE[facility]ENTER AT [facility]   4/17/2017 6:00 PM Timo Romero PT [facility] AT [facility]   4/19/2017 4:30 PM Alessandro Merrill PT REHAB CENTER AT [facility]   4/24/2017 6:00 PM Timo Romero PT RE[facility]ENTER AT [facility]   4/26/2017 4:30 PM Alessandro Merrill PT RE[facility]ENTER AT [facility]

## 2017-04-05 NOTE — PROGRESS NOTES
Mary Sheehan 30  [facility] AT [address] [facility], [address] [place] [address], [address] [place] [address]  Phone: [phone]  Fax: [phone]  PROGRESS NOTE  Patient Name: Miguel A Gonzales : 1990   Treatment/Medical Diagnosis: SI (sacroiliac) joint dysfunction [M53.3]   Referral Source: Janell Adkins DO     Date of Initial Visit: 2/15/17 Attended Visits: 11 Missed Visits: 0     SUMMARY OF TREATMENT  Physical therapy has consisted of therapeutic exercise and neuromuscular re-education for lumbar extension exercises, core strengthening, hip ROM, manual therapy including joint mobs, and muscle energy techniques to correct pelvic alingement, and DTM, pt education for activity modification and HEP, and ice for pain relief. CURRENT STATUS  Pt had made really good progress, with only mild R SI jiont pain until she played softball on . Where she aggravated her SI joint pain with shagging fly balls and hitting a few balls. That pain calmed down over the following week. Last night (3/4/17) she came in with 1/10 pain, with only mild R SI joint pain with extension, but left with significant \"pinching\" in her R SI and lateral R sacral border, and spasms in her HS region. She needs a SI joint specialist that works late. Our SI joitn specialists at [facility] do not work late, so I am transferring her to our [facility] clinic, which is closer to her home. Previous Goals:  1. All ADLs without LBP > 2/10  2. Achieve and maintain proper pelvic alingement for > 2 weeks  3. Able to return to previous level of activity  4. Independent with HEP     Prior Level/Current Level:  1) Prior Level: pain up to 3/10   Current Level: pain up to 7/10   Goal Met? no  2) Prior Level: poor pelvic alingment   Current Level: unable to maintain proper pelvic alignment   Goal Met?  No  3) Prior level: unable to return to previous level of activity   Current Level: significantt pain with attempted softball   GOAL MET? No  4. Prior Level: learning core stability HEP and ext based Hannah program   Current Level: Independent with                    Goal Met? Yes  New Goals to be achieved in __4__  weeks:  1. Achieve and maintain proper pelvic alingement for > 2 weeks  2. All ADLs without LBP > 2/10  3. Able to return to previoius level of activity, including running and softball, without LBP > 2/10  RECOMMENDATIONS  Continue PT 2x/week for 4 weeks  If you have any questions/comments please contact us directly at [phone]. Thank you for allowing us to assist in the care of your patient. Therapist Signature: Abelardo Chavez PT, DPT Date: 4/5/2017     Time: 7:02 PM   NOTE TO PHYSICIAN:  PLEASE COMPLETE THE ORDERS BELOW AND FAX TO   [facility] [place]: [phone]. If you are unable to process this request in 24 hours please contact our office: [phone].    ___ I have read the above report and request that my patient continue as recommended.   ___ I have read the above report and request that my patient continue therapy with the following changes/special instructions:_________________________________________________________   ___ I have read the above report and request that my patient be discharged from therapy.      Physician Signature:        Date:       Time:

## 2017-04-06 ENCOUNTER — APPOINTMENT (OUTPATIENT)
Dept: PHYSICAL THERAPY | Age: 27
End: 2017-04-06
Payer: COMMERCIAL

## 2017-04-12 ENCOUNTER — APPOINTMENT (OUTPATIENT)
Dept: PHYSICAL THERAPY | Age: 27
End: 2017-04-12
Payer: COMMERCIAL

## 2017-04-13 ENCOUNTER — HOSPITAL ENCOUNTER (OUTPATIENT)
Dept: PHYSICAL THERAPY | Age: 27
End: 2017-04-13
Payer: COMMERCIAL

## 2017-04-14 ENCOUNTER — HOSPITAL ENCOUNTER (OUTPATIENT)
Dept: PHYSICAL THERAPY | Age: 27
Discharge: HOME | End: 2017-04-14
Payer: COMMERCIAL

## 2017-04-14 PROCEDURE — 97110 THERAPEUTIC EXERCISES: CPT

## 2017-04-14 PROCEDURE — 97140 MANUAL THERAPY 1/> REGIONS: CPT

## 2017-04-14 NOTE — PROGRESS NOTES
PHYSICAL THERAPY - DAILY TREATMENT NOTE    Patient Name: Joe Crain        Date: 2017  : 1990   YES Patient  Verified  Visit #:     Insurance: Payor: Bryant Rashid / Plan: [address] [place] [address] Rd PT / Product Type: Commerical /      In time: 8:00 Out time: 8:45   Total Treatment Time: 45     Medicare Time Tracking (below)   Total Timed Codes (min):  na 1:1 Treatment Time:  na     TREATMENT AREA =  SI joint arthritis ([facility] [address] [M46.98]    SUBJECTIVE  Pain Level (on 0 to 10 scale):  2-3  / 10   Medication Changes/New allergies or changes in medical history, any new surgeries or procedures? NO    If yes, update Summary List   Subjective Functional Status/Changes:  []  No changes reported     I still feel the pinch on my R SI area with ext          OBJECTIVE  25 min Therapeutic Exercise:  [x]  See flow sheet   Rationale:      increase ROM, increase strength and improve coordination to improve the patients ability to perform ADLs     20 min Manual Therapy: DTM R Psoas, TFL, Gm, QL, Shot guntech, L5 L ERS, L on L Si osvaldo   Rationale:      decrease pain, increase ROM and increase tissue extensibility to improve patient's ability to perform ADLs     min Patient Education:  YES  Reviewed HEP   []  Progressed/Changed HEP based on: Other Objective/Functional Measures:    Inconsistent activation of R TrA noted     Post Treatment Pain Level (on 0 to 10) scale:   0  / 10     ASSESSMENT  Assessment/Changes in Function:     Good [place] to all Rx without increase in pain      []  See Progress Note/Recertification   Patient will continue to benefit from skilled PT services to modify and progress therapeutic interventions, address functional mobility deficits, address ROM deficits, address strength deficits, analyze and address soft tissue restrictions, analyze and cue movement patterns, analyze and modify body mechanics/ergonomics and assess and modify postural abnormalities to attain remaining goals. Progress toward goals / Updated goals:    No sig change towards LTGs today      PLAN  []  Upgrade activities as tolerated YES Continue plan of care   []  Discharge due to :    []  Other:      Therapist: Donya Felder, PT, OCS, SCS, CSCS    Date: 4/14/2017 Time: 8:51 AM       Future Appointments  Date Time Provider Ruthann Linda   4/17/2017 7:30 AM Isaura Beth, PT [facility]

## 2017-04-17 ENCOUNTER — HOSPITAL ENCOUNTER (OUTPATIENT)
Dept: PHYSICAL THERAPY | Age: 27
Discharge: HOME | End: 2017-04-17
Payer: COMMERCIAL

## 2017-04-17 ENCOUNTER — APPOINTMENT (OUTPATIENT)
Dept: PHYSICAL THERAPY | Age: 27
End: 2017-04-17
Payer: COMMERCIAL

## 2017-04-17 PROCEDURE — 97110 THERAPEUTIC EXERCISES: CPT

## 2017-04-17 PROCEDURE — 97140 MANUAL THERAPY 1/> REGIONS: CPT

## 2017-04-17 NOTE — PROGRESS NOTES
PHYSICAL THERAPY - DAILY TREATMENT NOTE    Patient Name: Mary Griffith        Date: 2017  : 1990   YES Patient  Verified  Visit #:   15   of   24  Insurance: Payor: Berta Valle / Plan: [address] [place] [address] [facility] / Product Type: Commerical /      In time: 7:30 Out time: 8:10   Total Treatment Time: 40     Medicare Time Tracking (below)   Total Timed Codes (min):  na 1:1 Treatment Time:  na     TREATMENT AREA =  SI joint arthritis ([address] [M46.98]    SUBJECTIVE  Pain Level (on 0 to 10 scale):  3  / 10   Medication Changes/New allergies or changes in medical history, any new surgeries or procedures? NO    If yes, update Summary List   Subjective Functional Status/Changes:  []  No changes reported     I have been working on breathing and I think it is helping          OBJECTIVE  30 min Therapeutic Exercise: [x] See flow sheet   Rationale: increase ROM, increase strength and improve coordination to improve the patients ability to perform ADLs      10 min Manual Therapy: DTM R Psoas, TFL, Gm, QL, Shot guntech, L5 L ERS, R PSIS PA with ext   Rationale: decrease pain, increase ROM and increase tissue extensibility to improve patient's ability to perform ADLs      min Patient Education:  YES  Reviewed HEP   []  Progressed/Changed HEP based on:         Other Objective/Functional Measures:  C/o pain at R SIJ with prone press ups, but resolved with PA mob to PSIS with prone press ups  C/o pain with bridge, but diminished with the use of band     Post Treatment Pain Level (on 0 to 10) scale:   0  / 10     ASSESSMENT  Assessment/Changes in Function:     Good ken[place] to all Rx without increase in pain      []  See Progress Note/Recertification   Patient will continue to benefit from skilled PT services to modify and progress therapeutic interventions, address functional mobility deficits, address ROM deficits, address strength deficits, analyze and address soft tissue restrictions, analyze and cue movement patterns, analyze and modify body mechanics/ergonomics and assess and modify postural abnormalities to attain remaining goals.    Progress toward goals / Updated goals:    Slowly progressing with pain reduction      PLAN  []  Upgrade activities as tolerated YES Continue plan of care   []  Discharge due to :    []  Other:      Therapist: Cely Urbina, PT, OCS, SCS, CSCS    Date: 4/17/2017 Time: 7:15 AM       Future Appointments  Date Time Provider Ruthann Linda   4/17/2017 7:30 AM Lott Goodpasture, PT [facility]

## 2017-04-19 ENCOUNTER — APPOINTMENT (OUTPATIENT)
Dept: PHYSICAL THERAPY | Age: 27
End: 2017-04-19
Payer: COMMERCIAL

## 2017-04-24 ENCOUNTER — APPOINTMENT (OUTPATIENT)
Dept: PHYSICAL THERAPY | Age: 27
End: 2017-04-24
Payer: COMMERCIAL

## 2017-04-26 ENCOUNTER — APPOINTMENT (OUTPATIENT)
Dept: PHYSICAL THERAPY | Age: 27
End: 2017-04-26
Payer: COMMERCIAL

## 2017-05-12 NOTE — PROGRESS NOTES
[facility] PHYSICAL THERAPY  [address] [place] [address], [address],Lona Etienne, [address] [facility] - Phone: [phone]  Fax: [phone]  DISCHARGE SUMMARY  Patient Name: Tommie Arellano : 1990   Treatment/Medical Diagnosis: SI joint arthritis [facility]) [M46.98]   Referral Source: Mendoza Ramos DO     Date of Initial Visit: 2/15/17 Attended Visits: 13 Missed Visits: 0     [address] has been seen at our clinic 2-3x/wk for a total of 13 visits. Pt treatment has consisted of therapeutic exercise for  hip/core strengthening and manual therapy (SI jt mobilization and deep tissue mobilization at QL, lute med, TFL)  CURRENT STATUS  Pt has had a good tolerance to physical therapy treatment. She reported no pain at the end of 13th session. However, she did not return to PT treatment after the 13th visit. RECOMMENDATIONS  Discharge from physical therapy treatment with HEP. Specifics:   If you have any questions/comments please contact us directly at [phone]. Thank you for allowing us to assist in the care of your patient.     Therapist Signature: Romy Bangura, DPDANNY, OCS, SCS, CSCS Date: 2017     Time: 2:38 PM